# Patient Record
Sex: FEMALE | Race: WHITE
[De-identification: names, ages, dates, MRNs, and addresses within clinical notes are randomized per-mention and may not be internally consistent; named-entity substitution may affect disease eponyms.]

---

## 2023-02-24 ENCOUNTER — HOSPITAL ENCOUNTER (OUTPATIENT)
Dept: HOSPITAL 46 - DS | Age: 68
Discharge: HOME | End: 2023-02-24
Attending: SURGERY
Payer: MEDICARE

## 2023-02-24 VITALS — HEIGHT: 62 IN | WEIGHT: 145.28 LBS | BODY MASS INDEX: 26.74 KG/M2

## 2023-02-24 DIAGNOSIS — K44.9: ICD-10-CM

## 2023-02-24 DIAGNOSIS — K22.10: Primary | ICD-10-CM

## 2023-02-24 DIAGNOSIS — Z88.2: ICD-10-CM

## 2023-02-24 DIAGNOSIS — Z79.899: ICD-10-CM

## 2023-02-24 DIAGNOSIS — K21.9: ICD-10-CM

## 2023-02-24 PROCEDURE — G0500 MOD SEDAT ENDO SERVICE >5YRS: HCPCS

## 2023-02-24 PROCEDURE — 0DD78ZX EXTRACTION OF STOMACH, PYLORUS, VIA NATURAL OR ARTIFICIAL OPENING ENDOSCOPIC, DIAGNOSTIC: ICD-10-PCS | Performed by: SURGERY

## 2023-02-24 PROCEDURE — 0DD98ZX EXTRACTION OF DUODENUM, VIA NATURAL OR ARTIFICIAL OPENING ENDOSCOPIC, DIAGNOSTIC: ICD-10-PCS | Performed by: SURGERY

## 2023-02-24 PROCEDURE — 0DD18ZX EXTRACTION OF UPPER ESOPHAGUS, VIA NATURAL OR ARTIFICIAL OPENING ENDOSCOPIC, DIAGNOSTIC: ICD-10-PCS | Performed by: SURGERY

## 2023-02-24 PROCEDURE — 0DD38ZX EXTRACTION OF LOWER ESOPHAGUS, VIA NATURAL OR ARTIFICIAL OPENING ENDOSCOPIC, DIAGNOSTIC: ICD-10-PCS | Performed by: SURGERY

## 2023-02-24 NOTE — NUR
02/24/23 1053 Carie Joyce
1050 PATIENT ARRIVES TO PACU AWAKE BUT DROWSY. DENIES PAIN OR NAUSEA.
RESP EVEN AND UNLABORED, NC AT 3 LITERS TURNED OFF. SLEEPING WHEN NOT
STIMULATED.

## 2023-02-27 NOTE — OR
[facility]
                                    [address], [place]  [address]
_________________________________________________________________________________________
                                                                 Signed   
 
 
DATE OF OPERATION:
02/24/2023
 
SURGEON:
Kasandra Auguste MD
 
PREOPERATIVE DIAGNOSES:
1. Progressive cervical dysphagia and gastroesophageal reflux.
2. History of ectopic mucosa proximal esophagus in 2017.
 
POSTOPERATIVE DIAGNOSES:
1. Proximal esophageal ectopic mucosa, uncertain regarding Cook's epithelium.
2. Distal ulcerative esophagitis and hiatal hernia.
 
PROCEDURE:
Esophagogastroduodenoscopy with biopsy.
 
ANESTHESIA:
Intravenous sedation, fentanyl 100 mcg and Versed 3.5 mg.
 
INDICATION:
This 67-year-old white woman is a patient of [facility].  In 2017, she underwent
upper endoscopy for complaints of cervical dysphagia.  She had reflux symptoms as well.
She is found to have abnormal appearing epithelium 13 cm from the incisors suggestive of
Cook epithelium.  Pathology report showed mild chronic inflammation and reactive
changes negative for goblet cell metaplasia, though it did look suggestive of Cook
epithelium.  She was seen by me in January of 2023 more than a year ago, anticipating
followup upper endoscopy with persistent symptoms.  Due to the pandemic and other
factors, she delayed endoscopy till this time.  She continues to have a rather
significant cervical dysphagia and is using Tums on a routine basis.  She was prescribed
Prevacid previously which she says she has taken though it is not listed in the main
medical record at this time.  She is here to undergo upper endoscopy to better
characterize her reflux issue and cervical dysphagia as well as ectopic mucosa of the
proximal cervical esophagus.  She understands the risk of bleeding, infection, and
perforation related to upper endoscopy and wished to proceed. 
 
FINDINGS:
The mucosa as previously noted was still present.  There was no evidence of neoplasm
there.  Passage of the scope into the proximal esophagus was slightly challenging and
may indicate an underlying process.  There is erosive ulcerated distal esophagitis as
well.  She did have a moderate to large-sized hiatal hernia.  The stomach and duodenum
are reasonably normal.  CLOtest was negative. 
 
    Electronically Signed By: KASANDRA AUGUSTE MD  02/27/23 0808
_________________________________________________________________________________________
PATIENT NAME:     ROSANNE GEE                     
MEDICAL RECORD #: [id number]            OPERATIVE REPORT              
          ACCT #: [id number]  
DATE OF BIRTH:   [birth year]            REPORT #: [id number]      
PHYSICIAN:        KASANDRA AUGUSTE MD                 
PCP:              [facility]              
REPORT IS CONFIDENTIAL AND NOT TO BE RELEASED WITHOUT AUTHORIZATION
 
 
                                  [facility]
                                    [address], [place]  [address]
_________________________________________________________________________________________
                                                                 Signed   
 
 
 
DESCRIPTION OF PROCEDURE:
The patient was brought to the endoscopy suite and placed in the lateral decubitus
position after undergoing topical Hurricaine spray hypopharyngeal anesthesia.  A bite
block was placed.  An Olympus video upper endoscope was passed in the hypopharynx.  The
vocal cords appeared normal.  The posterior commissure had mild irritative changes.
There was no neoplasm.  Advancement of the scope into the esophagus initially was not
particularly easy, though it was not difficult.  Once in the esophagus, it was easily
passed down more distally.  In the distal esophagus was linear ulcerative esophagitis.
No evidence of Cook epithelium.  No stricture.  The scope was passed into the stomach
which was insufflated with air.  Rugal folds were normal.  The antrum was reasonably
normal though mildly inflamed and pylorus was normal. Scope was passed through into the
duodenum, which was mildly inflamed.  Biopsies were obtained of the duodenum and scope
withdrawn.  Biopsies taken of the antrum and more proximal stomach.  Retroflexed view
confirmed a moderate-sized hiatal hernia.  The scope was straightened and withdrawn to
the distal esophagus where linear ulcerative changes were noted and these were biopsied.
 There was no evidence of neoplasm proper and no sign of Cook epithelium.  The scope
was withdrawn to the mid esophagus which was biopsied and mindful of previous findings
in the proximal esophagus, careful inspection undertaken proximally.  There is an
erythematous mucosa patch suggestive of Cook's epithelium, which was difficult to
biopsy as the patient had some issues with swallowing given the scope being at about
13-15 cm.  A biopsy was obtained, however.  The scope was removed and the patient was
taken to the recovery room in good condition. 
 
CONCLUDING DIAGNOSES:
She has had some progression of cervical dysphagia, known underlying reflux and hiatal
hernia.  Distal ulcerative esophagitis was noted as well. 
 
At this point, we will order a video esophagram to better characterize the proximal
esophagus, assess for other competing causes of cervical dysphagia including Zenker's
diverticulum and I have dedicated prescription for Prilosec 20 mg p.o. b.i.d.  We will
see her back in the office following her video esophagram to review her reports and
outline the plan of management and assess response to change of therapy. 
 
 
 
            ________________________________________
            Kasandra Auguste MD 
 
 
/[facility]
Job #:  [id number]
 
    Electronically Signed By: KASANDRA AUGUSTE MD  02/27/23 0808
_________________________________________________________________________________________
PATIENT NAME:     ROSANNE GEE                     
MEDICAL RECORD #: [id number]            OPERATIVE REPORT              
          ACCT #: [id number]  
DATE OF BIRTH:   [birth year]            REPORT #: [id number]      
PHYSICIAN:        KASANDRA AUGUSTE MD                 
PCP:              [facility]              
REPORT IS CONFIDENTIAL AND NOT TO BE RELEASED WITHOUT AUTHORIZATION
 
 
                                  [address]  [address]
_________________________________________________________________________________________
                                                                 Signed   
 
 
DD:  02/24/2023 11:00:39
DT:  02/24/2023 12:48:59
 
cc:            CHIKIS Rosenbaum
 
 
Copies:  [facility]
~
 
 
 
 
 
 
 
 
 
 
 
 
 
 
 
 
 
 
 
 
 
 
 
 
 
 
 
 
 
 
 
 
 
 
 
    Electronically Signed By: KASANDRA AUGUSTE MD  02/27/23 0808
_________________________________________________________________________________________
PATIENT NAME:     ROSANNE GEE                     
MEDICAL RECORD #: [id number]            OPERATIVE REPORT              
          ACCT #: [id number]  
DATE OF BIRTH:   [birth year]            REPORT #: [id number]      
PHYSICIAN:        KASANDRA AUGUSTE MD                 
PCP:              [facility] PO[facility]              
REPORT IS CONFIDENTIAL AND NOT TO BE RELEASED WITHOUT AUTHORIZATION

## 2023-02-28 NOTE — PATH
[facility]
                                    [address] [place], [place]  [address]
_________________________________________________________________________________________
                                                                 Signed   
 
 
 
SPECIMEN(S): A DUODENAL BIOPSIES
SPECIMEN(S): B ANTRUM BIOPSIES
SPECIMEN(S): C PROXIMAL STOMACH BIOPSIES
SPECIMEN(S): D DISTAL ESOPHAGEAL BIOPSIES
SPECIMEN(S): E MID ESOPHAGEAL BIOPSIES
SPECIMEN(S): F PROXIMAL ESOPHAGEAL BIOPSY AT 15CM
 
SPECIMEN SOURCE:
A. DUODENAL BIOPSIES
B. ANTRUM BIOPSIES
C. PROXIMAL STOMACH BIOPSIES
D. DISTAL ESOPHAGEAL BIOPSIES
E. MID ESOPHAGEAL BIOPSIES
F. PROXIMAL ESOPHAGEAL BIOPSY AT 15CM
 
CLINICAL HISTORY:
Pre: GERD, surveillance.  Post: Large hiatal hernia, esophagitis, proximal 
ectopic epithelium. 
 
FINAL PATHOLOGIC DIAGNOSIS:
A. Duodenal biopsies:
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
-  Negative for atypical epithelial features or pathologic inflammation.
B. Antrum biopsies:
-  Benign gastric antral-type mucosa with focal slight chronic inflammation.
-  Negative for evidence of helicobacter organisms on routine HE stained 
sections. 
C. Proximal stomach biopsies:
-  Benign gastric-type mucosa with focal slight chronic inflammation.
-  Negative for Helicobacter organisms on routine HE stained sections.
D. Distal esophageal biopsies:
-  Benign esophageal epithelium and gastric glandular mucosa, negative for 
specialized intestinal metaplasia or dysplasia. 
-  Negative for increased epithelial eosinophils within the esophageal 
epithelium. 
E. Mid esophageal biopsies:
-  Benign esophageal epithelium, negative for increased epithelial eosinophils.
F. Proximal esophageal biopsy at 15 cm:
 
-  Benign esophageal epithelium, negative for increased epithelial eosinophils.
JVR:smh:[id number]
 
                                                                                    
_________________________________________________________________________________________
PATIENT NAME:     ROSANNE GEE                     
MEDICAL RECORD #: [id number]            PATHOLOGY                     
          ACCT #: [id number]       ACCESSION #: [id number]     
DATE OF BIRTH:   [birth year]            REPORT #: [id number]       
PHYSICIAN:        MELODY PATHOLOGY              
PCP:              [facility]ITA CURRY              
REPORT IS CONFIDENTIAL AND NOT TO BE RELEASED WITHOUT AUTHORIZATION
 
 
                                  [facility]
                                    [address], [place]  [address]
_________________________________________________________________________________________
                                                                 Signed   
 
 
 
MICROSCOPIC EXAMINATION:
Histologic sections of all submitted blocks are examined by light microscopy.  
These findings, together with the gross examination, support the pathologic 
diagnosis. 
 
GROSS DESCRIPTION:
A. The specimen, labeled and designated "Egyptian, duodenal biopsies," is 
received in formalin and consists of two tan soft tissue fragments, ranging 
from 0.4-0.5 cm. Entirely submitted in (A1). 
B. The specimen, labeled and designated "Egyptian, antrum biopsies," is received 
in formalin and consists of two tan soft tissue fragments, ranging from 0.4-0.6 
cm. Entirely submitted in (B1). 
C. The specimen, labeled and designated "Egyptian, proximal stomach biopsies," is 
received in formalin and consists of three tan soft tissue fragments, ranging 
from 0.2-0.7 cm. Entirely submitted in 
(C1).
D. The specimen, labeled and designated "Egyptian, distal esophageal biopsies," 
is received in formalin and consists of four tan soft tissue fragments, ranging 
from 0.2-0.3 cm. Entirely submitted in 
(D1).
 
E. The specimen, labeled and designated "Egyptian, mid esophageal biopsies," is 
received in formalin and consists of three tan soft tissue fragments, ranging 
from 0.2-0.5 cm. Entirely submitted in (E1). 
F. The specimen, labeled and designated "Egyptian, proximal esophageal biopsy at 
15 cm," is received in formalin and consists of one tan soft tissue fragment, 
0.4 cm. Entirely submitted in (F1). 
VB  (under the direct supervision of a pathologist)
The Gross Description was prepared using a voice recognition system. The report 
was reviewed for accuracy; however, sound-alike word errors, addition and/or 
deletions may occur. If there is any 
question about this report, please contact Client Services.
 
PERFORMING LABORATORY:
The technical component was performed by [facility], [address] [place], [place] [address] [address] (CLIA# [id number]).  Professional interpretation was 
performed by [facility], 
[address] [place] [address], [place] [address] (CLIA#: [id number]).
 
Diagnostician:  Phi Hunter MD
Pathologist
 
                                                                                    
_________________________________________________________________________________________
PATIENT NAME:     ROSANNE GEE                     
MEDICAL RECORD #: [id number]            PATHOLOGY                     
          ACCT #: [id number]       ACCESSION #: [id number]     
DATE OF BIRTH:   [birth year]            REPORT #: [id number]       
PHYSICIAN:        MELODY HERNANDEZ              
PCP:              [facility] CURRY              
REPORT IS CONFIDENTIAL AND NOT TO BE RELEASED WITHOUT AUTHORIZATION
 
 
                                  [facility]
                                    [address] [place] [address]  [address]
_________________________________________________________________________________________
                                                                 Signed   
 
 
Electronically Signed 02/28/2023
 
 
Copies:                                
~
 
 
 
 
 
 
 
 
 
 
 
 
 
 
 
 
 
 
 
 
 
 
 
 
 
 
 
 
 
 
 
 
 
 
 
 
 
 
                                                                                    
_________________________________________________________________________________________
PATIENT NAME:     ROSANNE GEE                     
MEDICAL RECORD #: [id number]            PATHOLOGY                     
          ACCT #: [id number]       ACCESSION #: [id number]     
DATE OF BIRTH:   [birth year]            REPORT #: [id number]       
PHYSICIAN:        MELODY HERNANDEZ              
PCP:              [facility]ITA CURRY              
REPORT IS CONFIDENTIAL AND NOT TO BE RELEASED WITHOUT AUTHORIZATION

## 2025-01-12 ENCOUNTER — HOSPITAL ENCOUNTER (EMERGENCY)
Dept: HOSPITAL 46 - ED | Age: 70
Discharge: HOME | End: 2025-01-12
Payer: MEDICARE

## 2025-01-12 VITALS — WEIGHT: 153 LBS | BODY MASS INDEX: 28.16 KG/M2 | HEIGHT: 62 IN

## 2025-01-12 VITALS — DIASTOLIC BLOOD PRESSURE: 108 MMHG | SYSTOLIC BLOOD PRESSURE: 128 MMHG

## 2025-01-12 DIAGNOSIS — Z88.2: ICD-10-CM

## 2025-01-12 DIAGNOSIS — K21.9: ICD-10-CM

## 2025-01-12 DIAGNOSIS — J18.9: Primary | ICD-10-CM

## 2025-01-12 DIAGNOSIS — Z87.891: ICD-10-CM

## 2025-01-12 LAB
ALBUMIN SERPL-MCNC: 3.2 G/DL (ref 3.4–5)
ALBUMIN/GLOB SERPL: 0.65 {RATIO} (ref 1.1–2.4)
ALP SERPL-CCNC: 159 U/L (ref 46–116)
ALT SERPL W P-5'-P-CCNC: 91 U/L (ref 14–59)
ANION GAP SERPL CALCULATED.4IONS-SCNC: 11.7 MMOL/L (ref 7–21)
AST SERPL-CCNC: 92 U/L (ref 15–37)
BASOPHILS NFR BLD AUTO: 3 % (ref 0–2)
BUN SERPL-MCNC: 6 MG/DL (ref 7–18)
BUN/CREAT SERPL: 5.71 (ref 6–28.6)
CALCIUM SERPL-MCNC: 8.9 MG/DL (ref 8.5–10.1)
CHLORIDE SERPL-SCNC: 96 MMOL/L (ref 98–107)
CO2 SERPL-SCNC: 27 MMOL/L (ref 21–32)
DEPRECATED RDW RBC AUTO: 14.1 FL (ref 10.5–15)
EGFRCR SERPLBLD CKD-EPI 2021: 57 ML/MIN (ref 60–?)
EOSINOPHIL NFR BLD AUTO: 0.2 % (ref 0–6)
GLOBULIN SER-MCNC: 4.9 G/DL (ref 1.8–3.5)
HCT VFR BLD AUTO: 45.3 % (ref 35–50)
HGB BLD-MCNC: 15.5 G/DL (ref 12–18)
LYMPHOCYTES NFR BLD AUTO: 5.6 % (ref 24–44)
MAGNESIUM SERPL-MCNC: 1.6 MG/DL (ref 1.8–2.4)
MCH RBC QN AUTO: 37.1 PG (ref 27–36)
MCHC RBC AUTO-ENTMCNC: 34.3 G/DL (ref 30–36)
MCV RBC AUTO: 108.3 FL (ref 81–99)
MONOCYTES NFR BLD AUTO: 9.4 % (ref 0–12)
NEUTROPHILS NFR BLD AUTO: 81.8 % (ref 39–80)
PLATELET # BLD AUTO: 199 K/UL (ref 140–440)
POTASSIUM SERPL-SCNC: 3.7 MMOL/L (ref 3.5–5.1)
PROT SERPL-MCNC: 8.1 G/DL (ref 6.4–8.2)
RBC # BLD AUTO: 4.18 M/UL (ref 4.3–5.7)

## 2025-01-12 PROCEDURE — A9270 NON-COVERED ITEM OR SERVICE: HCPCS

## 2025-01-12 NOTE — EKG
[facility]
                                    [address] [facility]
                                  [place]  [address]
_________________________________________________________________________________________
                                                                 Signed   
 
 
Normal sinus rhythm
Normal ECG
No previous ECGs available
Confirmed by Kimber Rodrigues MD ([id number]) on 1/12/2025 3:14:37 PM
 
 
 
 
 
 
 
 
 
 
 
 
 
 
 
 
 
 
 
 
 
 
 
 
 
 
 
 
 
 
 
 
 
 
 
 
 
 
 
 
 
    Electronically Signed By: KIMBER RODRIGUES MD  01/12/25 1514
_________________________________________________________________________________________
PATIENT NAME:     ROSANNE GEE                     
MEDICAL RECORD #: [id number]                     Electrocardiogram             
          ACCT #: [id number]  
DATE OF BIRTH:   [birth year]                                       
PHYSICIAN:   KIMBER RODRIGUES MD                 REPORT #: [id number]
REPORT IS CONFIDENTIAL AND NOT TO BE RELEASED WITHOUT AUTHORIZATION

## 2025-04-08 ENCOUNTER — HOSPITAL ENCOUNTER (EMERGENCY)
Dept: HOSPITAL 46 - ED | Age: 70
Discharge: HOME | End: 2025-04-08
Payer: MEDICARE

## 2025-04-08 VITALS — WEIGHT: 143.3 LBS | BODY MASS INDEX: 26.37 KG/M2 | HEIGHT: 62 IN

## 2025-04-08 VITALS — SYSTOLIC BLOOD PRESSURE: 123 MMHG | DIASTOLIC BLOOD PRESSURE: 70 MMHG

## 2025-04-08 DIAGNOSIS — Z87.891: ICD-10-CM

## 2025-04-08 DIAGNOSIS — Z79.899: ICD-10-CM

## 2025-04-08 DIAGNOSIS — Z88.2: ICD-10-CM

## 2025-04-08 DIAGNOSIS — K52.9: Primary | ICD-10-CM

## 2025-04-08 LAB
ALBUMIN SERPL-MCNC: 3.2 G/DL (ref 3.4–5)
ALBUMIN/GLOB SERPL: 0.73 {RATIO} (ref 1.1–2.4)
ANION GAP SERPL CALCULATED.4IONS-SCNC: 19.5 MMOL/L (ref 7–21)
BASOPHILS NFR BLD AUTO: 0.9 % (ref 0–2)
BUN/CREAT SERPL: 8.04 (ref 6–28.6)
CALCIUM SERPL-MCNC: 8.8 MG/DL (ref 8.5–10.1)
DEPRECATED RDW RBC AUTO: 14.4 FL (ref 10.5–15)
EOSINOPHIL NFR BLD AUTO: 0.3 % (ref 0–6)
GLOBULIN SER-MCNC: 4.4 G/DL (ref 1.8–3.5)
HCT VFR BLD AUTO: 42.1 % (ref 35–50)
HGB BLD-MCNC: 14.9 G/DL (ref 12–18)
HGB UR QL STRIP: NEGATIVE
KETONES UR QL STRIP: (no result)
LEUKOCYTE ESTERASE UR QL STRIP: NEGATIVE
LYMPHOCYTES NFR BLD AUTO: 11.6 % (ref 24–44)
MCH RBC QN AUTO: 36.9 PG (ref 27–36)
MCHC RBC AUTO-ENTMCNC: 35.4 G/DL (ref 30–36)
MCV RBC AUTO: 104.1 FL (ref 81–99)
MONOCYTES NFR BLD AUTO: 8.5 % (ref 0–12)
NEUTROPHILS NFR BLD AUTO: 78.7 % (ref 39–80)
NITRITE UR QL STRIP: NEGATIVE
PLATELET # BLD AUTO: 233 K/UL (ref 140–440)
POTASSIUM SERPL-SCNC: 3.5 MMOL/L (ref 3.5–5.1)
PROT SERPL-MCNC: 7.6 G/DL (ref 6.4–8.2)
RBC # BLD AUTO: 4.04 M/UL (ref 4.3–5.7)

## 2025-04-08 PROCEDURE — A9270 NON-COVERED ITEM OR SERVICE: HCPCS

## 2025-04-16 ENCOUNTER — HOSPITAL ENCOUNTER (INPATIENT)
Dept: HOSPITAL 46 - ED | Age: 70
LOS: 12 days | Discharge: NURSING, REHAB OR LONG-TERM CARE | DRG: 418 | End: 2025-04-28
Attending: STUDENT IN AN ORGANIZED HEALTH CARE EDUCATION/TRAINING PROGRAM | Admitting: STUDENT IN AN ORGANIZED HEALTH CARE EDUCATION/TRAINING PROGRAM
Payer: MEDICARE

## 2025-04-16 VITALS — SYSTOLIC BLOOD PRESSURE: 104 MMHG | DIASTOLIC BLOOD PRESSURE: 63 MMHG

## 2025-04-16 VITALS — HEIGHT: 62 IN | BODY MASS INDEX: 26.49 KG/M2 | WEIGHT: 143.96 LBS

## 2025-04-16 VITALS — SYSTOLIC BLOOD PRESSURE: 125 MMHG | DIASTOLIC BLOOD PRESSURE: 69 MMHG

## 2025-04-16 VITALS — DIASTOLIC BLOOD PRESSURE: 68 MMHG | SYSTOLIC BLOOD PRESSURE: 131 MMHG

## 2025-04-16 VITALS — DIASTOLIC BLOOD PRESSURE: 69 MMHG | SYSTOLIC BLOOD PRESSURE: 125 MMHG

## 2025-04-16 DIAGNOSIS — Z87.19: ICD-10-CM

## 2025-04-16 DIAGNOSIS — E83.42: ICD-10-CM

## 2025-04-16 DIAGNOSIS — E87.8: ICD-10-CM

## 2025-04-16 DIAGNOSIS — R62.7: ICD-10-CM

## 2025-04-16 DIAGNOSIS — F02.80: ICD-10-CM

## 2025-04-16 DIAGNOSIS — Z88.2: ICD-10-CM

## 2025-04-16 DIAGNOSIS — Z87.01: ICD-10-CM

## 2025-04-16 DIAGNOSIS — E83.39: ICD-10-CM

## 2025-04-16 DIAGNOSIS — R33.8: ICD-10-CM

## 2025-04-16 DIAGNOSIS — K59.00: ICD-10-CM

## 2025-04-16 DIAGNOSIS — E87.6: ICD-10-CM

## 2025-04-16 DIAGNOSIS — Z87.891: ICD-10-CM

## 2025-04-16 DIAGNOSIS — K80.10: Primary | ICD-10-CM

## 2025-04-16 DIAGNOSIS — Z66: ICD-10-CM

## 2025-04-16 DIAGNOSIS — R63.0: ICD-10-CM

## 2025-04-16 DIAGNOSIS — G31.2: ICD-10-CM

## 2025-04-16 DIAGNOSIS — K21.9: ICD-10-CM

## 2025-04-16 DIAGNOSIS — R91.1: ICD-10-CM

## 2025-04-16 DIAGNOSIS — F32.A: ICD-10-CM

## 2025-04-16 DIAGNOSIS — F10.20: ICD-10-CM

## 2025-04-16 DIAGNOSIS — Z79.899: ICD-10-CM

## 2025-04-16 DIAGNOSIS — E87.1: ICD-10-CM

## 2025-04-16 DIAGNOSIS — K76.0: ICD-10-CM

## 2025-04-16 LAB
ALBUMIN SERPL-MCNC: 3.3 G/DL (ref 3.4–5)
ALBUMIN/GLOB SERPL: 0.77 {RATIO} (ref 1.1–2.4)
ANION GAP SERPL CALCULATED.4IONS-SCNC: 17.7 MMOL/L (ref 7–21)
ANION GAP SERPL CALCULATED.4IONS-SCNC: 17.9 MMOL/L (ref 7–21)
BACTERIA #/AREA URNS HPF: (no result) /HPF
BASOPHILS NFR BLD AUTO: 0.4 % (ref 0–2)
BUN/CREAT SERPL: 9.09 (ref 6–28.6)
CALCIUM SERPL-MCNC: 8.3 MG/DL (ref 8.5–10.1)
CASTS #/AREA URNS LPF: (no result) "LPF"
CRYSTALS URNS MICRO: (no result)
DEPRECATED RDW RBC AUTO: 14.5 FL (ref 10.5–15)
EOSINOPHIL NFR BLD AUTO: 0.2 % (ref 0–6)
EPI CELLS #/AREA URNS HPF: (no result) /LPF
GLOBULIN SER-MCNC: 4.3 G/DL (ref 1.8–3.5)
HCT VFR BLD AUTO: 40.4 % (ref 35–50)
HGB BLD-MCNC: 14.6 G/DL (ref 12–18)
HGB UR QL STRIP: NEGATIVE
KETONES UR QL STRIP: (no result)
LEUKOCYTE ESTERASE UR QL STRIP: (no result)
LYMPHOCYTES NFR BLD AUTO: 9.4 % (ref 24–44)
MAGNESIUM SERPL-MCNC: 1.7 MG/DL (ref 1.8–2.4)
MCH RBC QN AUTO: 36.6 PG (ref 27–36)
MCHC RBC AUTO-ENTMCNC: 36.2 G/DL (ref 30–36)
MCV RBC AUTO: 101.1 FL (ref 81–99)
MONOCYTES NFR BLD AUTO: 12.5 % (ref 0–12)
NEUTROPHILS NFR BLD AUTO: 77.5 % (ref 39–80)
NITRITE UR QL STRIP: NEGATIVE
PHOSPHATE SERPL-MCNC: 2.5 MG/DL (ref 2.5–4.9)
PLATELET # BLD AUTO: 227 K/UL (ref 140–440)
POTASSIUM SERPL-SCNC: 2.9 MMOL/L (ref 3.5–5.1)
POTASSIUM SERPL-SCNC: 3.7 MMOL/L (ref 3.5–5.1)
PROT SERPL-MCNC: 7.6 G/DL (ref 6.4–8.2)
TSH SERPL DL<=0.005 MIU/L-ACNC: 2.65 UIU/ML (ref 0.36–3.74)
URN SPEC COLLECT METH UR: (no result)

## 2025-04-16 PROCEDURE — P9612 CATHETERIZE FOR URINE SPEC: HCPCS

## 2025-04-16 PROCEDURE — A9270 NON-COVERED ITEM OR SERVICE: HCPCS

## 2025-04-17 VITALS — SYSTOLIC BLOOD PRESSURE: 133 MMHG | DIASTOLIC BLOOD PRESSURE: 93 MMHG

## 2025-04-17 VITALS — SYSTOLIC BLOOD PRESSURE: 146 MMHG | DIASTOLIC BLOOD PRESSURE: 78 MMHG

## 2025-04-17 VITALS — DIASTOLIC BLOOD PRESSURE: 93 MMHG | SYSTOLIC BLOOD PRESSURE: 133 MMHG

## 2025-04-17 VITALS — DIASTOLIC BLOOD PRESSURE: 104 MMHG | SYSTOLIC BLOOD PRESSURE: 136 MMHG

## 2025-04-17 VITALS — DIASTOLIC BLOOD PRESSURE: 95 MMHG | SYSTOLIC BLOOD PRESSURE: 135 MMHG

## 2025-04-17 VITALS — DIASTOLIC BLOOD PRESSURE: 55 MMHG | SYSTOLIC BLOOD PRESSURE: 123 MMHG

## 2025-04-17 VITALS — SYSTOLIC BLOOD PRESSURE: 116 MMHG | DIASTOLIC BLOOD PRESSURE: 67 MMHG

## 2025-04-17 VITALS — SYSTOLIC BLOOD PRESSURE: 123 MMHG | DIASTOLIC BLOOD PRESSURE: 55 MMHG

## 2025-04-17 VITALS — DIASTOLIC BLOOD PRESSURE: 67 MMHG | SYSTOLIC BLOOD PRESSURE: 116 MMHG

## 2025-04-17 VITALS — SYSTOLIC BLOOD PRESSURE: 148 MMHG | DIASTOLIC BLOOD PRESSURE: 97 MMHG

## 2025-04-17 LAB
ALBUMIN SERPL-MCNC: 2.6 G/DL (ref 3.4–5)
ALBUMIN/GLOB SERPL: 0.63 {RATIO} (ref 1.1–2.4)
BASOPHILS NFR BLD AUTO: 0.1 % (ref 0–2)
BUN/CREAT SERPL: 8.53 (ref 6–28.6)
CALCIUM SERPL-MCNC: 7.9 MG/DL (ref 8.5–10.1)
DEPRECATED RDW RBC AUTO: 14.6 FL (ref 10.5–15)
EOSINOPHIL NFR BLD AUTO: 0.1 % (ref 0–6)
GLOBULIN SER-MCNC: 4.1 G/DL (ref 1.8–3.5)
HCT VFR BLD AUTO: 37.7 % (ref 35–50)
HGB BLD-MCNC: 13.4 G/DL (ref 12–18)
LYMPHOCYTES NFR BLD AUTO: 6.2 % (ref 24–44)
MAGNESIUM SERPL-MCNC: 2.4 MG/DL (ref 1.8–2.4)
MCH RBC QN AUTO: 36.6 PG (ref 27–36)
MCHC RBC AUTO-ENTMCNC: 35.5 G/DL (ref 30–36)
MCV RBC AUTO: 102.8 FL (ref 81–99)
MONOCYTES NFR BLD AUTO: 12.9 % (ref 0–12)
NEUTROPHILS NFR BLD AUTO: 80.7 % (ref 39–80)
PLATELET # BLD AUTO: 217 K/UL (ref 140–440)
PROT SERPL-MCNC: 6.7 G/DL (ref 6.4–8.2)
RBC # BLD AUTO: 3.66 M/UL (ref 4.3–5.7)

## 2025-04-18 VITALS — DIASTOLIC BLOOD PRESSURE: 83 MMHG | SYSTOLIC BLOOD PRESSURE: 133 MMHG

## 2025-04-18 VITALS — DIASTOLIC BLOOD PRESSURE: 59 MMHG | SYSTOLIC BLOOD PRESSURE: 112 MMHG

## 2025-04-18 VITALS — SYSTOLIC BLOOD PRESSURE: 145 MMHG | DIASTOLIC BLOOD PRESSURE: 74 MMHG

## 2025-04-18 VITALS — SYSTOLIC BLOOD PRESSURE: 142 MMHG | DIASTOLIC BLOOD PRESSURE: 79 MMHG

## 2025-04-18 VITALS — DIASTOLIC BLOOD PRESSURE: 83 MMHG | SYSTOLIC BLOOD PRESSURE: 129 MMHG

## 2025-04-18 VITALS — DIASTOLIC BLOOD PRESSURE: 79 MMHG | SYSTOLIC BLOOD PRESSURE: 142 MMHG

## 2025-04-18 VITALS — DIASTOLIC BLOOD PRESSURE: 74 MMHG | SYSTOLIC BLOOD PRESSURE: 145 MMHG

## 2025-04-18 LAB
BASOPHILS NFR BLD AUTO: 0.6 % (ref 0–2)
BUN/CREAT SERPL: 6.94 (ref 6–28.6)
BUN/CREAT SERPL: 7.14 (ref 6–28.6)
CALCIUM SERPL-MCNC: 7.7 MG/DL (ref 8.5–10.1)
CALCIUM SERPL-MCNC: 8.1 MG/DL (ref 8.5–10.1)
DEPRECATED RDW RBC AUTO: 14.8 FL (ref 10.5–15)
EOSINOPHIL NFR BLD AUTO: 0.6 % (ref 0–6)
HCT VFR BLD AUTO: 32.9 % (ref 35–50)
HGB BLD-MCNC: 11.7 G/DL (ref 12–18)
LYMPHOCYTES NFR BLD AUTO: 9.8 % (ref 24–44)
MAGNESIUM SERPL-MCNC: 1.9 MG/DL (ref 1.8–2.4)
MAGNESIUM SERPL-MCNC: 2.2 MG/DL (ref 1.8–2.4)
MCH RBC QN AUTO: 36.5 PG (ref 27–36)
MCHC RBC AUTO-ENTMCNC: 35.6 G/DL (ref 30–36)
MCV RBC AUTO: 102.4 FL (ref 81–99)
MONOCYTES NFR BLD AUTO: 16.2 % (ref 0–12)
NEUTROPHILS NFR BLD AUTO: 72.8 % (ref 39–80)
PLATELET # BLD AUTO: 180 K/UL (ref 140–440)
RBC # BLD AUTO: 3.21 M/UL (ref 4.3–5.7)

## 2025-04-19 VITALS — DIASTOLIC BLOOD PRESSURE: 89 MMHG | SYSTOLIC BLOOD PRESSURE: 141 MMHG

## 2025-04-19 VITALS — DIASTOLIC BLOOD PRESSURE: 87 MMHG | SYSTOLIC BLOOD PRESSURE: 151 MMHG

## 2025-04-19 VITALS — SYSTOLIC BLOOD PRESSURE: 163 MMHG | DIASTOLIC BLOOD PRESSURE: 83 MMHG

## 2025-04-19 VITALS — SYSTOLIC BLOOD PRESSURE: 128 MMHG | DIASTOLIC BLOOD PRESSURE: 93 MMHG

## 2025-04-19 VITALS — SYSTOLIC BLOOD PRESSURE: 143 MMHG | DIASTOLIC BLOOD PRESSURE: 105 MMHG

## 2025-04-19 VITALS — SYSTOLIC BLOOD PRESSURE: 141 MMHG | DIASTOLIC BLOOD PRESSURE: 89 MMHG

## 2025-04-19 VITALS — DIASTOLIC BLOOD PRESSURE: 105 MMHG | SYSTOLIC BLOOD PRESSURE: 143 MMHG

## 2025-04-19 VITALS — DIASTOLIC BLOOD PRESSURE: 93 MMHG | SYSTOLIC BLOOD PRESSURE: 128 MMHG

## 2025-04-19 LAB
ANION GAP SERPL CALCULATED.4IONS-SCNC: 12.4 MMOL/L (ref 7–21)
BACTERIA #/AREA URNS HPF: (no result) /HPF
BASOPHILS NFR BLD AUTO: 0.3 % (ref 0–2)
BUN/CREAT SERPL: 3.17 (ref 6–28.6)
CALCIUM SERPL-MCNC: 7.8 MG/DL (ref 8.5–10.1)
CASTS #/AREA URNS LPF: (no result) "LPF"
CRYSTALS URNS MICRO: (no result)
DEPRECATED RDW RBC AUTO: 14.8 FL (ref 10.5–15)
EOSINOPHIL NFR BLD AUTO: 0.3 % (ref 0–6)
EPI CELLS #/AREA URNS HPF: (no result) /LPF
HCT VFR BLD AUTO: 33.6 % (ref 35–50)
HGB UR QL STRIP: (no result)
KETONES UR QL STRIP: (no result)
LEUKOCYTE ESTERASE UR QL STRIP: NEGATIVE
MAGNESIUM SERPL-MCNC: 1.9 MG/DL (ref 1.8–2.4)
MCH RBC QN AUTO: 36.5 PG (ref 27–36)
MCHC RBC AUTO-ENTMCNC: 35.7 G/DL (ref 30–36)
MCV RBC AUTO: 102.3 FL (ref 81–99)
MONOCYTES NFR BLD AUTO: 7.6 % (ref 0–12)
NEUTROPHILS NFR BLD AUTO: 84.8 % (ref 39–80)
NITRITE UR QL STRIP: NEGATIVE
PLATELET # BLD AUTO: 201 K/UL (ref 140–440)
POTASSIUM SERPL-SCNC: 3.4 MMOL/L (ref 3.5–5.1)
RBC # BLD AUTO: 3.28 M/UL (ref 4.3–5.7)
URN SPEC COLLECT METH UR: (no result)

## 2025-04-20 VITALS — SYSTOLIC BLOOD PRESSURE: 140 MMHG | DIASTOLIC BLOOD PRESSURE: 62 MMHG

## 2025-04-20 VITALS — DIASTOLIC BLOOD PRESSURE: 88 MMHG | SYSTOLIC BLOOD PRESSURE: 138 MMHG

## 2025-04-20 VITALS — SYSTOLIC BLOOD PRESSURE: 151 MMHG | DIASTOLIC BLOOD PRESSURE: 102 MMHG

## 2025-04-20 VITALS — SYSTOLIC BLOOD PRESSURE: 139 MMHG | DIASTOLIC BLOOD PRESSURE: 87 MMHG

## 2025-04-20 VITALS — SYSTOLIC BLOOD PRESSURE: 165 MMHG | DIASTOLIC BLOOD PRESSURE: 87 MMHG

## 2025-04-20 VITALS — DIASTOLIC BLOOD PRESSURE: 87 MMHG | SYSTOLIC BLOOD PRESSURE: 165 MMHG

## 2025-04-20 LAB
ANION GAP SERPL CALCULATED.4IONS-SCNC: 12.3 MMOL/L (ref 7–21)
ANION GAP SERPL CALCULATED.4IONS-SCNC: 13.3 MMOL/L (ref 7–21)
BASOPHILS NFR BLD AUTO: 0.4 % (ref 0–2)
BUN/CREAT SERPL: 1.81 (ref 6–28.6)
CALCIUM SERPL-MCNC: 7.7 MG/DL (ref 8.5–10.1)
CALCIUM SERPL-MCNC: 8.2 MG/DL (ref 8.5–10.1)
DEPRECATED RDW RBC AUTO: 14.7 FL (ref 10.5–15)
HCT VFR BLD AUTO: 33.6 % (ref 35–50)
HGB BLD-MCNC: 12.1 G/DL (ref 12–18)
LYMPHOCYTES NFR BLD AUTO: 13.9 % (ref 24–44)
MAGNESIUM SERPL-MCNC: 1.9 MG/DL (ref 1.8–2.4)
MCH RBC QN AUTO: 36.6 PG (ref 27–36)
MCHC RBC AUTO-ENTMCNC: 36.2 G/DL (ref 30–36)
MCV RBC AUTO: 101.1 FL (ref 81–99)
MONOCYTES NFR BLD AUTO: 17.2 % (ref 0–12)
NEUTROPHILS NFR BLD AUTO: 66.5 % (ref 39–80)
PLATELET # BLD AUTO: 194 K/UL (ref 140–440)
POTASSIUM SERPL-SCNC: 3.3 MMOL/L (ref 3.5–5.1)
POTASSIUM SERPL-SCNC: 4.3 MMOL/L (ref 3.5–5.1)
RBC # BLD AUTO: 3.32 M/UL (ref 4.3–5.7)

## 2025-04-21 VITALS — DIASTOLIC BLOOD PRESSURE: 85 MMHG | SYSTOLIC BLOOD PRESSURE: 151 MMHG

## 2025-04-21 VITALS — SYSTOLIC BLOOD PRESSURE: 158 MMHG | DIASTOLIC BLOOD PRESSURE: 99 MMHG

## 2025-04-21 VITALS — DIASTOLIC BLOOD PRESSURE: 92 MMHG | SYSTOLIC BLOOD PRESSURE: 148 MMHG

## 2025-04-21 VITALS — DIASTOLIC BLOOD PRESSURE: 99 MMHG | SYSTOLIC BLOOD PRESSURE: 158 MMHG

## 2025-04-21 VITALS — DIASTOLIC BLOOD PRESSURE: 67 MMHG | SYSTOLIC BLOOD PRESSURE: 114 MMHG

## 2025-04-21 VITALS — SYSTOLIC BLOOD PRESSURE: 119 MMHG | DIASTOLIC BLOOD PRESSURE: 60 MMHG

## 2025-04-21 VITALS — DIASTOLIC BLOOD PRESSURE: 60 MMHG | SYSTOLIC BLOOD PRESSURE: 119 MMHG

## 2025-04-21 LAB
ANION GAP SERPL CALCULATED.4IONS-SCNC: 12.6 MMOL/L (ref 7–21)
BASOPHILS NFR BLD AUTO: 0.8 % (ref 0–2)
BUN/CREAT SERPL: 1.69 (ref 6–28.6)
CALCIUM SERPL-MCNC: 7.9 MG/DL (ref 8.5–10.1)
DEPRECATED RDW RBC AUTO: 14.6 FL (ref 10.5–15)
EOSINOPHIL NFR BLD AUTO: 2.6 % (ref 0–6)
HCT VFR BLD AUTO: 33.1 % (ref 35–50)
HGB BLD-MCNC: 11.9 G/DL (ref 12–18)
LYMPHOCYTES NFR BLD AUTO: 17.5 % (ref 24–44)
MAGNESIUM SERPL-MCNC: 1.6 MG/DL (ref 1.8–2.4)
MCHC RBC AUTO-ENTMCNC: 35.9 G/DL (ref 30–36)
MCV RBC AUTO: 100.3 FL (ref 81–99)
MONOCYTES NFR BLD AUTO: 20.4 % (ref 0–12)
NEUTROPHILS NFR BLD AUTO: 58.7 % (ref 39–80)
PLATELET # BLD AUTO: 210 K/UL (ref 140–440)
POTASSIUM SERPL-SCNC: 3.6 MMOL/L (ref 3.5–5.1)

## 2025-04-22 VITALS — DIASTOLIC BLOOD PRESSURE: 71 MMHG | SYSTOLIC BLOOD PRESSURE: 150 MMHG

## 2025-04-22 VITALS — DIASTOLIC BLOOD PRESSURE: 67 MMHG | SYSTOLIC BLOOD PRESSURE: 133 MMHG

## 2025-04-22 VITALS — SYSTOLIC BLOOD PRESSURE: 160 MMHG | DIASTOLIC BLOOD PRESSURE: 87 MMHG

## 2025-04-22 VITALS — SYSTOLIC BLOOD PRESSURE: 168 MMHG | DIASTOLIC BLOOD PRESSURE: 91 MMHG

## 2025-04-22 VITALS — SYSTOLIC BLOOD PRESSURE: 118 MMHG | DIASTOLIC BLOOD PRESSURE: 81 MMHG

## 2025-04-22 VITALS — SYSTOLIC BLOOD PRESSURE: 133 MMHG | DIASTOLIC BLOOD PRESSURE: 67 MMHG

## 2025-04-22 VITALS — DIASTOLIC BLOOD PRESSURE: 86 MMHG | SYSTOLIC BLOOD PRESSURE: 177 MMHG

## 2025-04-22 VITALS — DIASTOLIC BLOOD PRESSURE: 81 MMHG | SYSTOLIC BLOOD PRESSURE: 162 MMHG

## 2025-04-22 VITALS — SYSTOLIC BLOOD PRESSURE: 150 MMHG | DIASTOLIC BLOOD PRESSURE: 71 MMHG

## 2025-04-22 LAB
ANION GAP SERPL CALCULATED.4IONS-SCNC: 10.9 MMOL/L (ref 7–21)
ANION GAP SERPL CALCULATED.4IONS-SCNC: 11.3 MMOL/L (ref 7–21)
ANION GAP SERPL CALCULATED.4IONS-SCNC: 5.6 MMOL/L (ref 7–21)
BUN/CREAT SERPL: 5.35 (ref 6–28.6)
BUN/CREAT SERPL: 5.88 (ref 6–28.6)
BUN/CREAT SERPL: 6.45 (ref 6–28.6)
CALCIUM SERPL-MCNC: 8.2 MG/DL (ref 8.5–10.1)
CALCIUM SERPL-MCNC: 8.2 MG/DL (ref 8.5–10.1)
EOSINOPHIL NFR BLD MANUAL: 2 %
HCT VFR BLD AUTO: 32.7 % (ref 35–50)
HGB BLD-MCNC: 11.7 G/DL (ref 12–18)
LYMPHOCYTES NFR BLD MANUAL: 25 %
MCH RBC QN AUTO: 36.2 PG (ref 27–36)
MCHC RBC AUTO-ENTMCNC: 35.7 G/DL (ref 30–36)
MCV RBC AUTO: 101.5 FL (ref 81–99)
MONOCYTES NFR BLD MANUAL: 16 %
NEUTS BAND NFR BLD MANUAL: 2 %
NEUTS SEG NFR BLD MANUAL: 55 %
PHOSPHATE SERPL-MCNC: 3.8 MG/DL (ref 2.5–4.9)
PLATELET # BLD AUTO: 212 K/UL (ref 140–440)
POTASSIUM SERPL-SCNC: 3.3 MMOL/L (ref 3.5–5.1)
POTASSIUM SERPL-SCNC: 3.6 MMOL/L (ref 3.5–5.1)
POTASSIUM SERPL-SCNC: 3.9 MMOL/L (ref 3.5–5.1)
RBC # BLD AUTO: 3.22 M/UL (ref 4.3–5.7)

## 2025-04-23 VITALS — DIASTOLIC BLOOD PRESSURE: 78 MMHG | SYSTOLIC BLOOD PRESSURE: 139 MMHG

## 2025-04-23 VITALS — SYSTOLIC BLOOD PRESSURE: 152 MMHG | DIASTOLIC BLOOD PRESSURE: 85 MMHG

## 2025-04-23 VITALS — DIASTOLIC BLOOD PRESSURE: 58 MMHG | SYSTOLIC BLOOD PRESSURE: 103 MMHG

## 2025-04-23 VITALS — DIASTOLIC BLOOD PRESSURE: 68 MMHG | SYSTOLIC BLOOD PRESSURE: 119 MMHG

## 2025-04-23 VITALS — SYSTOLIC BLOOD PRESSURE: 164 MMHG | DIASTOLIC BLOOD PRESSURE: 84 MMHG

## 2025-04-23 VITALS — SYSTOLIC BLOOD PRESSURE: 119 MMHG | DIASTOLIC BLOOD PRESSURE: 68 MMHG

## 2025-04-23 VITALS — DIASTOLIC BLOOD PRESSURE: 84 MMHG | SYSTOLIC BLOOD PRESSURE: 164 MMHG

## 2025-04-23 LAB
ALBUMIN SERPL-MCNC: 2.2 G/DL (ref 3.4–5)
ALBUMIN/GLOB SERPL: 0.65 {RATIO} (ref 1.1–2.4)
ANION GAP SERPL CALCULATED.4IONS-SCNC: 10.3 MMOL/L (ref 7–21)
ANION GAP SERPL CALCULATED.4IONS-SCNC: 10.7 MMOL/L (ref 7–21)
ANION GAP SERPL CALCULATED.4IONS-SCNC: 9.8 MMOL/L (ref 7–21)
BUN/CREAT SERPL: 3.17 (ref 6–28.6)
BUN/CREAT SERPL: 3.44 (ref 6–28.6)
BUN/CREAT SERPL: 4.61 (ref 6–28.6)
CALCIUM SERPL-MCNC: 8.1 MG/DL (ref 8.5–10.1)
DEPRECATED RDW RBC AUTO: 14.6 FL (ref 10.5–15)
EOSINOPHIL NFR BLD AUTO: 2.9 % (ref 0–6)
GLOBULIN SER-MCNC: 3.4 G/DL (ref 1.8–3.5)
HCT VFR BLD AUTO: 32.7 % (ref 35–50)
HGB BLD-MCNC: 11.6 G/DL (ref 12–18)
MAGNESIUM SERPL-MCNC: 1.7 MG/DL (ref 1.8–2.4)
MCH RBC QN AUTO: 36.1 PG (ref 27–36)
MCHC RBC AUTO-ENTMCNC: 35.5 G/DL (ref 30–36)
MCV RBC AUTO: 101.8 FL (ref 81–99)
MONOCYTES NFR BLD AUTO: 17.8 % (ref 0–12)
NEUTROPHILS NFR BLD AUTO: 59.3 % (ref 39–80)
PLATELET # BLD AUTO: 219 K/UL (ref 140–440)
POTASSIUM SERPL-SCNC: 3.7 MMOL/L (ref 3.5–5.1)
POTASSIUM SERPL-SCNC: 3.8 MMOL/L (ref 3.5–5.1)
POTASSIUM SERPL-SCNC: 4.3 MMOL/L (ref 3.5–5.1)
PROT SERPL-MCNC: 5.6 G/DL (ref 6.4–8.2)
RBC # BLD AUTO: 3.21 M/UL (ref 4.3–5.7)

## 2025-04-24 VITALS — SYSTOLIC BLOOD PRESSURE: 163 MMHG | DIASTOLIC BLOOD PRESSURE: 88 MMHG

## 2025-04-24 VITALS — SYSTOLIC BLOOD PRESSURE: 146 MMHG | DIASTOLIC BLOOD PRESSURE: 86 MMHG

## 2025-04-24 VITALS — DIASTOLIC BLOOD PRESSURE: 92 MMHG | SYSTOLIC BLOOD PRESSURE: 170 MMHG

## 2025-04-24 VITALS — DIASTOLIC BLOOD PRESSURE: 74 MMHG | SYSTOLIC BLOOD PRESSURE: 148 MMHG

## 2025-04-24 VITALS — DIASTOLIC BLOOD PRESSURE: 93 MMHG | SYSTOLIC BLOOD PRESSURE: 160 MMHG

## 2025-04-24 VITALS — SYSTOLIC BLOOD PRESSURE: 169 MMHG | DIASTOLIC BLOOD PRESSURE: 88 MMHG

## 2025-04-24 VITALS — DIASTOLIC BLOOD PRESSURE: 88 MMHG | SYSTOLIC BLOOD PRESSURE: 164 MMHG

## 2025-04-24 VITALS — DIASTOLIC BLOOD PRESSURE: 86 MMHG | SYSTOLIC BLOOD PRESSURE: 146 MMHG

## 2025-04-24 LAB
ALBUMIN SERPL-MCNC: 2.2 G/DL (ref 3.4–5)
ALBUMIN/GLOB SERPL: 0.65 {RATIO} (ref 1.1–2.4)
ANION GAP SERPL CALCULATED.4IONS-SCNC: 12.8 MMOL/L (ref 7–21)
ANION GAP SERPL CALCULATED.4IONS-SCNC: 12.9 MMOL/L (ref 7–21)
BASOPHILS NFR BLD AUTO: 1.2 % (ref 0–2)
BUN/CREAT SERPL: 1.78 (ref 6–28.6)
BUN/CREAT SERPL: 3.77 (ref 6–28.6)
CALCIUM SERPL-MCNC: 7.9 MG/DL (ref 8.5–10.1)
CALCIUM SERPL-MCNC: 8.3 MG/DL (ref 8.5–10.1)
DEPRECATED RDW RBC AUTO: 15.1 FL (ref 10.5–15)
EOSINOPHIL NFR BLD AUTO: 3.8 % (ref 0–6)
GLOBULIN SER-MCNC: 3.4 G/DL (ref 1.8–3.5)
HCT VFR BLD AUTO: 32.1 % (ref 35–50)
HGB BLD-MCNC: 11.3 G/DL (ref 12–18)
LYMPHOCYTES NFR BLD AUTO: 20.2 % (ref 24–44)
MCH RBC QN AUTO: 35.9 PG (ref 27–36)
MCHC RBC AUTO-ENTMCNC: 35.1 G/DL (ref 30–36)
MCV RBC AUTO: 102.1 FL (ref 81–99)
MONOCYTES NFR BLD AUTO: 15.6 % (ref 0–12)
NEUTROPHILS NFR BLD AUTO: 59.2 % (ref 39–80)
PLATELET # BLD AUTO: 228 K/UL (ref 140–440)
POTASSIUM SERPL-SCNC: 3.8 MMOL/L (ref 3.5–5.1)
POTASSIUM SERPL-SCNC: 3.9 MMOL/L (ref 3.5–5.1)
PROT SERPL-MCNC: 5.6 G/DL (ref 6.4–8.2)
RBC # BLD AUTO: 3.15 M/UL (ref 4.3–5.7)

## 2025-04-24 PROCEDURE — 0T9B70Z DRAINAGE OF BLADDER WITH DRAINAGE DEVICE, VIA NATURAL OR ARTIFICIAL OPENING: ICD-10-PCS | Performed by: STUDENT IN AN ORGANIZED HEALTH CARE EDUCATION/TRAINING PROGRAM

## 2025-04-24 PROCEDURE — BF121ZZ FLUOROSCOPY OF GALLBLADDER USING LOW OSMOLAR CONTRAST: ICD-10-PCS | Performed by: SURGERY

## 2025-04-24 PROCEDURE — 0FT44ZZ RESECTION OF GALLBLADDER, PERCUTANEOUS ENDOSCOPIC APPROACH: ICD-10-PCS | Performed by: SURGERY

## 2025-04-25 VITALS — SYSTOLIC BLOOD PRESSURE: 125 MMHG | DIASTOLIC BLOOD PRESSURE: 69 MMHG

## 2025-04-25 VITALS — DIASTOLIC BLOOD PRESSURE: 72 MMHG | SYSTOLIC BLOOD PRESSURE: 165 MMHG

## 2025-04-25 VITALS — SYSTOLIC BLOOD PRESSURE: 120 MMHG | DIASTOLIC BLOOD PRESSURE: 61 MMHG

## 2025-04-25 VITALS — DIASTOLIC BLOOD PRESSURE: 61 MMHG | SYSTOLIC BLOOD PRESSURE: 120 MMHG

## 2025-04-25 VITALS — DIASTOLIC BLOOD PRESSURE: 69 MMHG | SYSTOLIC BLOOD PRESSURE: 111 MMHG

## 2025-04-25 VITALS — SYSTOLIC BLOOD PRESSURE: 101 MMHG | DIASTOLIC BLOOD PRESSURE: 54 MMHG

## 2025-04-25 VITALS — DIASTOLIC BLOOD PRESSURE: 95 MMHG | SYSTOLIC BLOOD PRESSURE: 162 MMHG

## 2025-04-25 VITALS — SYSTOLIC BLOOD PRESSURE: 157 MMHG | DIASTOLIC BLOOD PRESSURE: 73 MMHG

## 2025-04-25 LAB
ALBUMIN SERPL-MCNC: 2.1 G/DL (ref 3.4–5)
ALBUMIN/GLOB SERPL: 0.62 {RATIO} (ref 1.1–2.4)
ANION GAP SERPL CALCULATED.4IONS-SCNC: 13.8 MMOL/L (ref 7–21)
BASOPHILS NFR BLD AUTO: 0.9 % (ref 0–2)
DEPRECATED RDW RBC AUTO: 14.9 FL (ref 10.5–15)
EOSINOPHIL NFR BLD AUTO: 0.3 % (ref 0–6)
GLOBULIN SER-MCNC: 3.4 G/DL (ref 1.8–3.5)
HCT VFR BLD AUTO: 30.5 % (ref 35–50)
HGB BLD-MCNC: 10.8 G/DL (ref 12–18)
LYMPHOCYTES NFR BLD AUTO: 12.3 % (ref 24–44)
MAGNESIUM SERPL-MCNC: 1.8 MG/DL (ref 1.8–2.4)
MCH RBC QN AUTO: 36.1 PG (ref 27–36)
MCHC RBC AUTO-ENTMCNC: 35.6 G/DL (ref 30–36)
MCV RBC AUTO: 101.6 FL (ref 81–99)
MONOCYTES NFR BLD AUTO: 10.6 % (ref 0–12)
NEUTROPHILS NFR BLD AUTO: 75.9 % (ref 39–80)
PHOSPHATE SERPL-MCNC: 3.1 MG/DL (ref 2.5–4.9)
PLATELET # BLD AUTO: 257 K/UL (ref 140–440)
POTASSIUM SERPL-SCNC: 3.8 MMOL/L (ref 3.5–5.1)
PROT SERPL-MCNC: 5.5 G/DL (ref 6.4–8.2)

## 2025-04-26 VITALS — DIASTOLIC BLOOD PRESSURE: 74 MMHG | SYSTOLIC BLOOD PRESSURE: 147 MMHG

## 2025-04-26 VITALS — SYSTOLIC BLOOD PRESSURE: 130 MMHG | DIASTOLIC BLOOD PRESSURE: 82 MMHG

## 2025-04-26 VITALS — DIASTOLIC BLOOD PRESSURE: 70 MMHG | SYSTOLIC BLOOD PRESSURE: 128 MMHG

## 2025-04-26 VITALS — SYSTOLIC BLOOD PRESSURE: 147 MMHG | DIASTOLIC BLOOD PRESSURE: 74 MMHG

## 2025-04-26 VITALS — DIASTOLIC BLOOD PRESSURE: 69 MMHG | SYSTOLIC BLOOD PRESSURE: 136 MMHG

## 2025-04-26 VITALS — SYSTOLIC BLOOD PRESSURE: 134 MMHG | DIASTOLIC BLOOD PRESSURE: 69 MMHG

## 2025-04-26 LAB
ALBUMIN/GLOB SERPL: 0.67 {RATIO} (ref 1.1–2.4)
ANION GAP SERPL CALCULATED.4IONS-SCNC: 9.7 MMOL/L (ref 7–21)
BACTERIA #/AREA URNS HPF: (no result) /HPF
BASOPHILS NFR BLD AUTO: 0.2 % (ref 0–2)
BUN/CREAT SERPL: 11.29 (ref 6–28.6)
CASTS #/AREA URNS LPF: (no result) "LPF"
CRYSTALS URNS MICRO: (no result)
DEPRECATED RDW RBC AUTO: 15.1 FL (ref 10.5–15)
EOSINOPHIL NFR BLD AUTO: 1.5 % (ref 0–6)
EPI CELLS #/AREA URNS HPF: (no result) /LPF
HCT VFR BLD AUTO: 29.4 % (ref 35–50)
HGB BLD-MCNC: 10.4 G/DL (ref 12–18)
HGB UR QL STRIP: NEGATIVE
KETONES UR QL STRIP: NEGATIVE
LEUKOCYTE ESTERASE UR QL STRIP: NEGATIVE
LYMPHOCYTES NFR BLD AUTO: 20.2 % (ref 24–44)
MAGNESIUM SERPL-MCNC: 1.8 MG/DL (ref 1.8–2.4)
MCH RBC QN AUTO: 36.2 PG (ref 27–36)
MCHC RBC AUTO-ENTMCNC: 35.2 G/DL (ref 30–36)
MCV RBC AUTO: 102.8 FL (ref 81–99)
NEUTROPHILS NFR BLD AUTO: 67.1 % (ref 39–80)
NITRITE UR QL STRIP: NEGATIVE
PHOSPHATE SERPL-MCNC: 3.3 MG/DL (ref 2.5–4.9)
PLATELET # BLD AUTO: 232 K/UL (ref 140–440)
POTASSIUM SERPL-SCNC: 3.7 MMOL/L (ref 3.5–5.1)
RBC # BLD AUTO: 2.86 M/UL (ref 4.3–5.7)
URN SPEC COLLECT METH UR: (no result)

## 2025-04-27 VITALS — SYSTOLIC BLOOD PRESSURE: 109 MMHG | DIASTOLIC BLOOD PRESSURE: 58 MMHG

## 2025-04-27 VITALS — DIASTOLIC BLOOD PRESSURE: 74 MMHG | SYSTOLIC BLOOD PRESSURE: 147 MMHG

## 2025-04-27 VITALS — SYSTOLIC BLOOD PRESSURE: 164 MMHG | DIASTOLIC BLOOD PRESSURE: 79 MMHG

## 2025-04-27 VITALS — SYSTOLIC BLOOD PRESSURE: 138 MMHG | DIASTOLIC BLOOD PRESSURE: 68 MMHG

## 2025-04-27 VITALS — SYSTOLIC BLOOD PRESSURE: 155 MMHG | DIASTOLIC BLOOD PRESSURE: 86 MMHG

## 2025-04-27 VITALS — SYSTOLIC BLOOD PRESSURE: 147 MMHG | DIASTOLIC BLOOD PRESSURE: 74 MMHG

## 2025-04-27 LAB
ALBUMIN SERPL-MCNC: 2.3 G/DL (ref 3.4–5)
ALBUMIN/GLOB SERPL: 0.68 {RATIO} (ref 1.1–2.4)
ANION GAP SERPL CALCULATED.4IONS-SCNC: 9.3 MMOL/L (ref 7–21)
BASOPHILS NFR BLD AUTO: 0.7 % (ref 0–2)
BUN/CREAT SERPL: 12.28 (ref 6–28.6)
CALCIUM SERPL-MCNC: 8.7 MG/DL (ref 8.5–10.1)
DEPRECATED RDW RBC AUTO: 14.9 FL (ref 10.5–15)
EOSINOPHIL NFR BLD AUTO: 1.2 % (ref 0–6)
GLOBULIN SER-MCNC: 3.4 G/DL (ref 1.8–3.5)
HGB BLD-MCNC: 10.7 G/DL (ref 12–18)
LYMPHOCYTES NFR BLD AUTO: 16.5 % (ref 24–44)
MAGNESIUM SERPL-MCNC: 1.7 MG/DL (ref 1.8–2.4)
MCH RBC QN AUTO: 36.4 PG (ref 27–36)
MCHC RBC AUTO-ENTMCNC: 35.7 G/DL (ref 30–36)
MCV RBC AUTO: 101.9 FL (ref 81–99)
MONOCYTES NFR BLD AUTO: 10.8 % (ref 0–12)
NEUTROPHILS NFR BLD AUTO: 70.8 % (ref 39–80)
PHOSPHATE SERPL-MCNC: 4.3 MG/DL (ref 2.5–4.9)
PLATELET # BLD AUTO: 255 K/UL (ref 140–440)
POTASSIUM SERPL-SCNC: 3.3 MMOL/L (ref 3.5–5.1)
PROT SERPL-MCNC: 5.7 G/DL (ref 6.4–8.2)
RBC # BLD AUTO: 2.94 M/UL (ref 4.3–5.7)

## 2025-04-28 VITALS — DIASTOLIC BLOOD PRESSURE: 98 MMHG | SYSTOLIC BLOOD PRESSURE: 166 MMHG

## 2025-04-28 VITALS — SYSTOLIC BLOOD PRESSURE: 166 MMHG | DIASTOLIC BLOOD PRESSURE: 98 MMHG

## 2025-04-28 VITALS — SYSTOLIC BLOOD PRESSURE: 120 MMHG | DIASTOLIC BLOOD PRESSURE: 65 MMHG

## 2025-04-28 LAB
ALBUMIN SERPL-MCNC: 2.5 G/DL (ref 3.4–5)
ALBUMIN/GLOB SERPL: 0.68 {RATIO} (ref 1.1–2.4)
BASOPHILS NFR BLD AUTO: 1.1 % (ref 0–2)
BUN/CREAT SERPL: 17.18 (ref 6–28.6)
EOSINOPHIL NFR BLD AUTO: 1.3 % (ref 0–6)
GLOBULIN SER-MCNC: 3.7 G/DL (ref 1.8–3.5)
HCT VFR BLD AUTO: 31.3 % (ref 35–50)
HGB BLD-MCNC: 11.1 G/DL (ref 12–18)
LYMPHOCYTES NFR BLD AUTO: 18.6 % (ref 24–44)
MAGNESIUM SERPL-MCNC: 1.8 MG/DL (ref 1.8–2.4)
MCH RBC QN AUTO: 36.1 PG (ref 27–36)
MCHC RBC AUTO-ENTMCNC: 35.4 G/DL (ref 30–36)
MONOCYTES NFR BLD AUTO: 12.7 % (ref 0–12)
NEUTROPHILS NFR BLD AUTO: 66.3 % (ref 39–80)
PHOSPHATE SERPL-MCNC: 4.8 MG/DL (ref 2.5–4.9)
PLATELET # BLD AUTO: 264 K/UL (ref 140–440)
PROT SERPL-MCNC: 6.2 G/DL (ref 6.4–8.2)
RBC # BLD AUTO: 3.07 M/UL (ref 4.3–5.7)